# Patient Record
Sex: MALE | Race: BLACK OR AFRICAN AMERICAN
[De-identification: names, ages, dates, MRNs, and addresses within clinical notes are randomized per-mention and may not be internally consistent; named-entity substitution may affect disease eponyms.]

---

## 2018-02-05 ENCOUNTER — HOSPITAL ENCOUNTER (OUTPATIENT)
Dept: HOSPITAL 92 - LABBT | Age: 68
Discharge: HOME | End: 2018-02-05
Attending: INTERNAL MEDICINE
Payer: OTHER GOVERNMENT

## 2018-02-05 DIAGNOSIS — I25.10: ICD-10-CM

## 2018-02-05 DIAGNOSIS — Z01.812: Primary | ICD-10-CM

## 2018-02-05 LAB
ALBUMIN SERPL BCG-MCNC: 3.9 G/DL (ref 3.4–4.8)
ALP SERPL-CCNC: 46 U/L (ref 40–150)
ALT SERPL W P-5'-P-CCNC: 21 U/L (ref 8–55)
ANION GAP SERPL CALC-SCNC: 11 MMOL/L (ref 10–20)
APTT PPP: 28.8 SEC (ref 22.9–36.1)
AST SERPL-CCNC: 15 U/L (ref 5–34)
BILIRUB SERPL-MCNC: 0.6 MG/DL (ref 0.2–1.2)
BUN SERPL-MCNC: 16 MG/DL (ref 8.4–25.7)
CALCIUM SERPL-MCNC: 9.5 MG/DL (ref 7.8–10.44)
CHD RISK SERPL-RTO: 2.3 (ref ?–4.5)
CHLORIDE SERPL-SCNC: 106 MMOL/L (ref 98–107)
CHOLEST SERPL-MCNC: 169 MG/DL
CO2 SERPL-SCNC: 20 MMOL/L (ref 23–31)
CREAT CL PREDICTED SERPL C-G-VRATE: 0 ML/MIN (ref 70–130)
GLOBULIN SER CALC-MCNC: 2.6 G/DL (ref 2.4–3.5)
GLUCOSE SERPL-MCNC: 70 MG/DL (ref 80–115)
HDLC SERPL-MCNC: 74 MG/DL
HGB BLD-MCNC: 14.9 G/DL (ref 14–18)
INR PPP: 1
LDLC SERPL CALC-MCNC: 78 MG/DL
MCH RBC QN AUTO: 41.2 PG (ref 27–31)
MCV RBC AUTO: 123 FL (ref 80–94)
PLATELET # BLD AUTO: 274 THOU/UL (ref 130–400)
POTASSIUM SERPL-SCNC: 4.4 MMOL/L (ref 3.5–5.1)
PROTHROMBIN TIME: 12.8 SEC (ref 12–14.7)
RBC # BLD AUTO: 3.62 MILL/UL (ref 4.7–6.1)
SODIUM SERPL-SCNC: 133 MMOL/L (ref 136–145)
TRIGL SERPL-MCNC: 87 MG/DL (ref ?–150)
WBC # BLD AUTO: 7.4 THOU/UL (ref 4.8–10.8)

## 2018-02-05 PROCEDURE — 80053 COMPREHEN METABOLIC PANEL: CPT

## 2018-02-05 PROCEDURE — 85610 PROTHROMBIN TIME: CPT

## 2018-02-05 PROCEDURE — 80061 LIPID PANEL: CPT

## 2018-02-05 PROCEDURE — 85730 THROMBOPLASTIN TIME PARTIAL: CPT

## 2018-02-05 PROCEDURE — 85027 COMPLETE CBC AUTOMATED: CPT

## 2018-02-06 ENCOUNTER — HOSPITAL ENCOUNTER (OUTPATIENT)
Dept: HOSPITAL 92 - CCL | Age: 68
Discharge: HOME | End: 2018-02-06
Attending: INTERNAL MEDICINE
Payer: OTHER GOVERNMENT

## 2018-02-06 DIAGNOSIS — K21.9: ICD-10-CM

## 2018-02-06 DIAGNOSIS — Z95.5: ICD-10-CM

## 2018-02-06 DIAGNOSIS — F17.200: ICD-10-CM

## 2018-02-06 DIAGNOSIS — Z79.02: ICD-10-CM

## 2018-02-06 DIAGNOSIS — Z79.82: ICD-10-CM

## 2018-02-06 DIAGNOSIS — I25.10: Primary | ICD-10-CM

## 2018-02-06 DIAGNOSIS — E78.00: ICD-10-CM

## 2018-02-06 DIAGNOSIS — I73.9: ICD-10-CM

## 2018-02-06 DIAGNOSIS — Z90.49: ICD-10-CM

## 2018-02-06 DIAGNOSIS — Z88.1: ICD-10-CM

## 2018-02-06 DIAGNOSIS — Z88.2: ICD-10-CM

## 2018-02-06 DIAGNOSIS — Z79.899: ICD-10-CM

## 2018-02-06 DIAGNOSIS — R07.9: ICD-10-CM

## 2018-02-06 DIAGNOSIS — Z89.611: ICD-10-CM

## 2018-02-06 DIAGNOSIS — Z98.890: ICD-10-CM

## 2018-02-06 DIAGNOSIS — Z89.612: ICD-10-CM

## 2018-02-06 DIAGNOSIS — I10: ICD-10-CM

## 2018-02-06 PROCEDURE — C9600 PERC DRUG-EL COR STENT SING: HCPCS

## 2018-02-06 PROCEDURE — 93798 PHYS/QHP OP CAR RHAB W/ECG: CPT

## 2018-02-06 PROCEDURE — C1887 CATHETER, GUIDING: HCPCS

## 2018-02-06 PROCEDURE — C1874 STENT, COATED/COV W/DEL SYS: HCPCS

## 2018-02-06 PROCEDURE — 85347 COAGULATION TIME ACTIVATED: CPT

## 2018-02-06 PROCEDURE — C1769 GUIDE WIRE: HCPCS

## 2018-02-06 PROCEDURE — 93005 ELECTROCARDIOGRAM TRACING: CPT

## 2018-02-06 PROCEDURE — 92928 PRQ TCAT PLMT NTRAC ST 1 LES: CPT

## 2018-02-06 PROCEDURE — 93458 L HRT ARTERY/VENTRICLE ANGIO: CPT

## 2018-02-06 NOTE — DIS
DATE OF ADMISSION:  02/06/2018

 

DATE OF DISCHARGE:  02/06/2018

 

DATE OF OUTPATIENT PROCEDURE:  02/06/2018

 

INDICATION FOR PROCEDURE:  A 67-year-old patient with known coronary disease and chest pain.

 

ADMITTING DIAGNOSES:  Coronary artery disease, abnormal stress testing, history of stent placement in
 the past, history of continued tobacco abuse, history of hypercholesterolemia, history of hypertensi
on, gastroesophageal reflux disease, significant peripheral vascular disease.  He is status post left
 knee surgery.  He has bilateral leg amputations.  He has had right testicular surgery.  He has had a
 right rib surgery, hernia repair.  He has had a stent placed in the lower extremity and also has had
 a cholecystectomy.

 

DISCHARGE DIAGNOSES:  Coronary artery disease, abnormal stress testing, history of stent placement in
 the past, history of continued tobacco abuse, history of hypercholesterolemia, history of hypertensi
on, gastroesophageal reflux disease, significant peripheral vascular disease.  He is status post left
 knee surgery.  He has bilateral leg amputations.  He has had right testicular surgery.  He has had a
 right rib surgery, hernia repair.  He has had a stent placed in the lower extremity and also has had
 a cholecystectomy.

 

PROCEDURES IN THE HOSPITAL:  Included cardiac catheterization, left ventriculogram, coronary arteriog
irene, angioplasty and stent placement to the second obtuse marginal branch of the left circumflex.

 

DISCHARGE MEDICATIONS:  Include lisinopril 40 mg daily, magnesium 400 mg daily, methocarbamol 750 mg 
tablet daily, metoprolol 12.5 mg b.i.d., nitroglycerin sublingual tablets 0.4 mg as needed, simvastat
in 20 mg daily, Flomax 0.4 mg daily, trazodone 1 tablet 100 mg daily, albuterol sulfate 2 puffs every
 6 hours p.r.n. as needed, amlodipine 10 mg daily, aspirin 325 mg daily, Symbicort 160/4.5 two puffs 
b.i.d., vitamin D3 2000 units orally every day, clonidine 0.1 mg tablets b.i.d., Plavix 75 mg daily, 
Folvite 1 mg daily, hydrocodone/acetaminophen 5/325 one tablet p.r.n. as directed, Hydrea 1 tablet 50
0 mg daily, Imdur 30 mg tablets daily.

 

He will follow up with me in the office in the next 2-4 weeks.  He will continue his routine followup
s with his primary care physician, Dr. Cathie Villanueva.

 

HOSPITAL COURSE:  This is a very pleasant 67-year-old gentleman who unfortunately continues to smoke,
 has severe peripheral vascular disease and coronary disease, has undergone angioplasty and stent lien
cement in left anterior descending artery, presents to the office complaining of further chest discom
fort.  He was advised to undergo repeat cardiac catheterization.  He was taken to the cardiac cathete
rization lab where he was found to have a patent stent in the left anterior descending artery and pro
gression of disease in second obtuse marginal branch of left circumflex which was 95% occluded and al
so he has a 90% stenosis in the first diagonal branch that arises from the left anterior descending a
rtery just at the takeoff at the stented left anterior descending artery.  The right coronary had lum
inal irregularities, but no flow limiting disease was noted.  The left ventricular systolic function 
is still normal, ejection fraction of 60%.  He underwent angioplasty today with a 2.5 mm balloon to t
he obtuse marginal branch of the left circumflex with still some residual stenosis.  Then he underwen
t further stent placement with a 2.25 x 12 mm drug-coated stent to the obtuse marginal branch of left
 circumflex with 0% residual stenosis.  He was given some nitroglycerin intracoronary during the proc
edure.  He tolerated the procedure well.  There were no difficulties or complications during the proc
edure with the balloon inflated.  He described this as the same pain that he has been experiencing at
 home.  I believe this was successful.  This most likely is the culprit vessel that was involved with
 his angina.  I will see him back in the office in the next 2-4 weeks.  At this time, he has remained
 stable and will be discharged to home within the next 6 hours and if there is no bleeding complicati
ons from the actual procedure.

## 2018-02-06 NOTE — EKG
Test Reason : POST PTCA/STENT

Blood Pressure : ***/*** mmHG

Vent. Rate : 059 BPM     Atrial Rate : 059 BPM

   P-R Int : 140 ms          QRS Dur : 068 ms

    QT Int : 426 ms       P-R-T Axes : 058 008 068 degrees

   QTc Int : 421 ms

 

Sinus bradycardia with sinus arrhythmia

Otherwise normal ECG

When compared with ECG of 11-MAY-2012 13:08,

No significant change was found

Confirmed by MARIA DEL CARMEN SY ([id number]) on 2/6/2018 9:09:03 PM

 

Referred By:  ELLIOTT           Confirmed By:MARIA DEL CARMEN SY

## 2019-03-12 ENCOUNTER — HOSPITAL ENCOUNTER (OUTPATIENT)
Dept: HOSPITAL 92 - CT | Age: 69
Discharge: HOME | End: 2019-03-12
Attending: THORACIC SURGERY (CARDIOTHORACIC VASCULAR SURGERY)
Payer: COMMERCIAL

## 2019-03-12 DIAGNOSIS — Z53.9: ICD-10-CM

## 2019-03-12 DIAGNOSIS — I25.10: Primary | ICD-10-CM

## 2019-03-12 LAB — ESTIMATED GFR-MDRD - POC: (no result)

## 2019-03-12 PROCEDURE — 82565 ASSAY OF CREATININE: CPT

## 2019-03-14 ENCOUNTER — HOSPITAL ENCOUNTER (OUTPATIENT)
Dept: HOSPITAL 92 - CT | Age: 69
Discharge: HOME | End: 2019-03-14
Attending: THORACIC SURGERY (CARDIOTHORACIC VASCULAR SURGERY)
Payer: COMMERCIAL

## 2019-03-14 DIAGNOSIS — I25.10: Primary | ICD-10-CM

## 2019-03-14 DIAGNOSIS — I66.8: ICD-10-CM

## 2019-03-14 DIAGNOSIS — I70.201: ICD-10-CM

## 2019-03-14 PROCEDURE — 75635 CT ANGIO ABDOMINAL ARTERIES: CPT

## 2019-03-14 NOTE — CT
CT ANGIO ABDOMEN AND PELVIS AND BILATERAL LOWER EXTREMITIES WITH INTRAVENOUS CONTRAST WITH 3D RECONST
RUCTIONS:

 

HISTORY:

Bilateral leg pain, worse on the right side.

 

COMPARISON:

06/29/2017

 

FINDINGS:

The lung bases are clear.  The liver, spleen, pancreas, and gallbladder regions appear unremarkable o
n this angiographic phase exam.

 

The right and left adrenal glands and the right and left kidneys are normal in size.  The left renal 
cyst is stable, as compared to the previous exam.  There is no significant periaortic or mesenteric a
denopathy.  There is sigmoid diverticulosis noted.

 

There is some mild atherosclerotic change of the abdominal aorta, which is normal in caliber.  No dis
section or aneurysm.  There is some mild plaque formation at the origin of the celiac artery.  There 
is more pronounced plaque formation at the origin of the superior mesenteric artery, with a mild to m
oderate degree of narrowing, difficult to assess due to the calcified plaque.  A single left renal ar
brian is seen without significant stenosis.  There are two right renal arteries without any significan
t narrowing.  JAYASHREE is patent.

 

The right lower extremity runoff shows some moderate plaque and mild to moderate narrowing along the 
course of the common iliac artery, with more severe stenosis near the origin of the right external il
iac artery.  There is also fairly extensive plaque formation in the internal iliac.  There is moderat
e narrowing of the right common femoral artery, just above the level of the anastomosis, related to t
he stent that has been placed.  Once again, it is noted that this stent is occluded.  There is flow w
ithin the profunda femoral artery.  The native superficial femoral artery is occluded.  The patient h
as undergone an above-the-knee amputation on this side.

 

On the left side, there is mild to moderate narrowing along the course of the common iliac artery, an
d moderately severe stenosis of the origin of the external and internal iliac arteries, and in the mo
re distal external iliac artery, another area of more severe stenosis.  There is dilatation at the le
ft common femoral vein level that is similar to the prior examination.  There are two occluded stents
 seen, which extend down to the level of the below the knee amputation.  There is occlusion of the na
tive superficial femoral artery.  The profunda femoral artery patent, although it appears to be fairl
y markedly narrowed at its origin.

 

IMPRESSION:

1.  No evidence of aortic aneurysm dissection.

 

2.  Mild to moderate stenosis of the common iliac artery with moderately severe narrowing of the righ
t external iliac artery at its origin.  Also, moderate stenosis of the common femoral artery, just ab
ove the anastomosis of an occluded left-sided stent.  There is flow within the profunda femoral arter
y, but the native left femoral artery is occluded.

 

3.  On the left side, similar changes.  There is mild to moderate narrowing of the common iliac arter
y, moderately severe stenosis of the origin of the external iliac artery, and a second focal area of 
marked narrowing of the left external iliac artery, more distally.  There are two stents that are occ
luded on the left side, and the native left superficial femoral artery is occluded.  There is flow wi
thin the profunda femoral artery and its branches.

 

POS: [facility]

## 2019-04-01 ENCOUNTER — HOSPITAL ENCOUNTER (OUTPATIENT)
Dept: HOSPITAL 92 - LABBT | Age: 69
Discharge: HOME | End: 2019-04-01
Attending: THORACIC SURGERY (CARDIOTHORACIC VASCULAR SURGERY)
Payer: MEDICARE

## 2019-04-01 DIAGNOSIS — I65.8: ICD-10-CM

## 2019-04-01 DIAGNOSIS — Z01.812: Primary | ICD-10-CM

## 2019-04-01 LAB
ANION GAP SERPL CALC-SCNC: 14 MMOL/L (ref 10–20)
BUN SERPL-MCNC: 11 MG/DL (ref 8.4–25.7)
CALCIUM SERPL-MCNC: 9 MG/DL (ref 7.8–10.44)
CHLORIDE SERPL-SCNC: 101 MMOL/L (ref 98–107)
CO2 SERPL-SCNC: 18 MMOL/L (ref 23–31)
CREAT CL PREDICTED SERPL C-G-VRATE: 0 ML/MIN (ref 70–130)
GLUCOSE SERPL-MCNC: 71 MG/DL (ref 80–115)
HGB BLD-MCNC: 13.6 G/DL (ref 14–18)
MCH RBC QN AUTO: 41.2 PG (ref 27–31)
MCV RBC AUTO: 119 FL (ref 78–98)
PLATELET # BLD AUTO: 210 THOU/UL (ref 130–400)
POTASSIUM SERPL-SCNC: 4.7 MMOL/L (ref 3.5–5.1)
RBC # BLD AUTO: 3.31 MILL/UL (ref 4.7–6.1)
SODIUM SERPL-SCNC: 128 MMOL/L (ref 136–145)
WBC # BLD AUTO: 5.2 THOU/UL (ref 4.8–10.8)

## 2019-04-01 PROCEDURE — 85027 COMPLETE CBC AUTOMATED: CPT

## 2019-04-01 PROCEDURE — 80048 BASIC METABOLIC PNL TOTAL CA: CPT

## 2019-04-10 ENCOUNTER — HOSPITAL ENCOUNTER (OUTPATIENT)
Dept: HOSPITAL 92 - CCL | Age: 69
Discharge: HOME | End: 2019-04-10
Attending: THORACIC SURGERY (CARDIOTHORACIC VASCULAR SURGERY)
Payer: MEDICARE

## 2019-04-10 VITALS — BODY MASS INDEX: 19.2 KG/M2

## 2019-04-10 DIAGNOSIS — E78.5: ICD-10-CM

## 2019-04-10 DIAGNOSIS — Z79.899: ICD-10-CM

## 2019-04-10 DIAGNOSIS — I25.10: ICD-10-CM

## 2019-04-10 DIAGNOSIS — Z79.82: ICD-10-CM

## 2019-04-10 DIAGNOSIS — I70.221: Primary | ICD-10-CM

## 2019-04-10 DIAGNOSIS — Z89.611: ICD-10-CM

## 2019-04-10 DIAGNOSIS — B19.20: ICD-10-CM

## 2019-04-10 DIAGNOSIS — Z88.2: ICD-10-CM

## 2019-04-10 DIAGNOSIS — I10: ICD-10-CM

## 2019-04-10 PROCEDURE — 99152 MOD SED SAME PHYS/QHP 5/>YRS: CPT

## 2019-04-10 PROCEDURE — 99153 MOD SED SAME PHYS/QHP EA: CPT

## 2019-04-10 PROCEDURE — C1769 GUIDE WIRE: HCPCS

## 2019-04-10 PROCEDURE — 85347 COAGULATION TIME ACTIVATED: CPT

## 2019-04-10 PROCEDURE — C1725 CATH, TRANSLUMIN NON-LASER: HCPCS

## 2019-04-10 PROCEDURE — 37221: CPT

## 2019-04-10 PROCEDURE — 76942 ECHO GUIDE FOR BIOPSY: CPT

## 2019-04-10 NOTE — OP
DATE OF PROCEDURE:  04/10/2019



PREOPERATIVE DIAGNOSIS:  Ischemic rest pain, right AKA stump.



PROCEDURE PERFORMED:  Iliac angiography with right external iliac artery stent using

8 x 40 Innova posted with a 7 balloon. 



ANESTHESIA:  General.



ESTIMATED BLOOD LOSS:  Minimal.



FLUORO:  3.2 minutes.



DESCRIPTION OF PROCEDURE:  After prepping and draping the right groin,

ultrasound-guided puncture was carried out.  Wire passed and initially a 4-Peruvian

dilator and sheath were placed and angiography obtained.  The patient had about a

70% diameter stenosis of the proximal external iliac artery on the right with minor

irregularities otherwise and a 70% stenosis of the profunda femoral artery.  The

patient was heparinized.  A 4-Peruvian, 5-Peruvian, and then 6-Peruvian marker sheaths

were placed.  The Innova stent was then deployed just below the hypogastric takeoff

on the right and posted with a 7 balloon.  Following this, completion angiography

showed a good result and the patient tolerated the procedure well.  Heparin was

reversed and sheath removed. 







Job ID:  [id number]

## 2019-06-03 ENCOUNTER — HOSPITAL ENCOUNTER (OUTPATIENT)
Dept: HOSPITAL 92 - CT | Age: 69
Discharge: HOME | End: 2019-06-03
Attending: CLINIC/CENTER
Payer: OTHER GOVERNMENT

## 2019-06-03 DIAGNOSIS — Z72.0: Primary | ICD-10-CM

## 2019-06-03 PROCEDURE — G0297 LDCT FOR LUNG CA SCREEN: HCPCS

## 2019-06-03 NOTE — CT
EXAM: CT chest without contrast per low-dose cancer screening protocol



HISTORY: History of smoking and nicotine dependence; greater than 50 pack-year smoking history



COMPARISON: None



TECHNIQUE: Multiple contiguous axial images were obtained in a CT of the chest without contrast per l
ow-dose cancer screening protocol. Sagittal and coronal reformats were performed.



FINDINGS: 

Pulmonary nodules: Calcified granulomas are seen in the right middle lobe. No suspicious pulmonary no
dules are seen. No focal infiltrates are seen.

Pleural space: No pneumothorax or pleural effusion are seen. 



Heart: The heart is normal in size. Atherosclerotic calcifications in the aorta and coronary arteries
.

Mediastinum: No hilar or mediastinal lymphadenopathy appreciated on this limited noncontrast examinat
ion.



Bones: Unremarkable.



Visualized subdiaphragmatic structures: Unremarkable.



IMPRESSION:

Lung RADS category 1-negative.



Reported By: Isidro Conteh 

Electronically Signed:  6/3/2019 1:11 PM

## 2020-06-04 ENCOUNTER — HOSPITAL ENCOUNTER (EMERGENCY)
Dept: HOSPITAL 92 - ERS | Age: 70
Discharge: HOME | End: 2020-06-04
Payer: MEDICARE

## 2020-06-04 DIAGNOSIS — I25.10: ICD-10-CM

## 2020-06-04 DIAGNOSIS — E78.5: ICD-10-CM

## 2020-06-04 DIAGNOSIS — K21.9: ICD-10-CM

## 2020-06-04 DIAGNOSIS — F17.200: ICD-10-CM

## 2020-06-04 DIAGNOSIS — T87.89: Primary | ICD-10-CM

## 2020-06-04 DIAGNOSIS — I10: ICD-10-CM

## 2020-06-04 PROCEDURE — 96372 THER/PROPH/DIAG INJ SC/IM: CPT

## 2020-10-09 NOTE — CT
CT PULMONARY LUNG SCAN WITHOUT IV CONTRAST:

 

INDICATION: 

A 70-year-old male with a history of being  current smoker and smoking approximately 1 pack per day o
ngoing for 55 years.  The patient has a history of leukemia and right rib resection.  The patient is 
also a double amputee.  The patient has a history of COPD.

 

COMPARISON: 

Prior CT pulmonary lung scan dated Megan 3, 2019 and a CTA bilateral lower extremity runoff dated Marc
h 14, 2019.

 

FINDINGS: 

 

Lungs:  There is no suspicious pulmonary nodule that is suspicious for early malignancy.  There are c
hanges of scattered centrilobular emphysema which is moderate in severity.  There are areas of mild p
eripheral fibrosis seen with scattered areas of mild bronchiectasis.  There are calcified granuloma w
ithin the right middle lobe.  No suspicious confluent airspace opacity is grossly evident.

 

Pleural Space:  No pleural effusion or pneumothorax is demonstrated.

 

Mediastinum:  There are coronary artery and thoracic aorta calcifications which are severe.  There ar
e aortic annular calcifications.  No pathologically enlarged lymph nodes are evident.  There is mucus
 debris seen along the left posterolateral aspect of the distal mainstem trachea that is inspissated 
and has locules of gas within it.

 

Upper Abdomen:  There is a stable prominent cyst partially visualized involving the mid left kidney. 
 The spleen is grossly unremarkable on this noncontrast exam.  The near total visualization of the li
aishwarya appears within normal limits.  Visualized aspects of the unopacified large and small bowel reveal
 no acute abnormality.  There is scattered colonic diverticulosis.  There is a tiny cyst involving th
e superior pole of the right kidney.  The visualized gallbladder is unremarkable.

 

Osseous Structures:  There is diffuse osteopenia present.  There is thoracic spondylosis.  The right 
1st rib is partially resected.  There are surgical clips within the right axilla.  No suspicious oste
olytic or osteoblastic lesion is identified.

 

IMPRESSION: 

Lung RADS category 1 - benign.  Recommend low-dose annual lung cancer screening in 1 year.

 

Category S:

Moderate emphysema.  Findings of prior granulomatous disease.  Prominent vascular calcifications of t
he thoracic aorta and coronary arteries.  Colonic diverticulosis.  Bilateral renal cysts.  Diffuse os
teopenia.  Postprocedural change of a right partial 1st rib resection.

 

POS: [facility]

## 2020-10-09 NOTE — ULT
ABDOMINAL AORTIC ULTRASOUND:

 

HISTORY: 

Abdominal aortic aneurysm screening.

 

FINDINGS: 

Real-time imaging of the abdominal aorta shows atherosclerotic change.  The proximal aorta measures 2
.2, the mid aorta 1.8, and distal aorta 1.5 cm.  The proximal common iliac arteries also appear nondi
lated.

 

IMPRESSION: 

No evidence of abdominal aortic aneurysm.

 

POS: [facility]

## 2021-12-21 ENCOUNTER — HOSPITAL ENCOUNTER (OUTPATIENT)
Dept: HOSPITAL 92 - CSHULT | Age: 71
Discharge: HOME | End: 2021-12-21
Attending: INTERNAL MEDICINE
Payer: MEDICARE

## 2021-12-21 DIAGNOSIS — N28.1: ICD-10-CM

## 2021-12-21 DIAGNOSIS — N18.1: ICD-10-CM

## 2021-12-21 DIAGNOSIS — J44.9: ICD-10-CM

## 2021-12-21 DIAGNOSIS — D47.3: ICD-10-CM

## 2021-12-21 DIAGNOSIS — R80.9: ICD-10-CM

## 2021-12-21 DIAGNOSIS — N40.0: ICD-10-CM

## 2021-12-21 DIAGNOSIS — N39.0: ICD-10-CM

## 2021-12-21 DIAGNOSIS — I25.10: ICD-10-CM

## 2021-12-21 DIAGNOSIS — M19.90: ICD-10-CM

## 2021-12-21 DIAGNOSIS — I73.9: ICD-10-CM

## 2021-12-21 DIAGNOSIS — I13.10: Primary | ICD-10-CM

## 2021-12-21 DIAGNOSIS — E87.1: ICD-10-CM

## 2021-12-21 DIAGNOSIS — E78.5: ICD-10-CM

## 2021-12-21 PROCEDURE — 76770 US EXAM ABDO BACK WALL COMP: CPT

## 2022-01-27 ENCOUNTER — HOSPITAL ENCOUNTER (OUTPATIENT)
Dept: HOSPITAL 92 - CSHRAD | Age: 72
Discharge: HOME | End: 2022-01-27
Attending: INTERNAL MEDICINE
Payer: MEDICARE

## 2022-01-27 ENCOUNTER — HOSPITAL ENCOUNTER (OUTPATIENT)
Dept: HOSPITAL 92 - CSHERS | Age: 72
Setting detail: OBSERVATION
LOS: 1 days | Discharge: HOME | End: 2022-01-28
Attending: INTERNAL MEDICINE | Admitting: FAMILY MEDICINE
Payer: OTHER GOVERNMENT

## 2022-01-27 DIAGNOSIS — Z20.822: ICD-10-CM

## 2022-01-27 DIAGNOSIS — Z95.5: ICD-10-CM

## 2022-01-27 DIAGNOSIS — I10: ICD-10-CM

## 2022-01-27 DIAGNOSIS — Z86.718: ICD-10-CM

## 2022-01-27 DIAGNOSIS — E78.5: ICD-10-CM

## 2022-01-27 DIAGNOSIS — G89.4: ICD-10-CM

## 2022-01-27 DIAGNOSIS — Z79.899: ICD-10-CM

## 2022-01-27 DIAGNOSIS — I82.611: ICD-10-CM

## 2022-01-27 DIAGNOSIS — Z79.82: ICD-10-CM

## 2022-01-27 DIAGNOSIS — D75.839: ICD-10-CM

## 2022-01-27 DIAGNOSIS — Z89.512: ICD-10-CM

## 2022-01-27 DIAGNOSIS — J44.9: ICD-10-CM

## 2022-01-27 DIAGNOSIS — Z89.611: ICD-10-CM

## 2022-01-27 DIAGNOSIS — Z86.73: ICD-10-CM

## 2022-01-27 DIAGNOSIS — F17.210: ICD-10-CM

## 2022-01-27 DIAGNOSIS — N40.0: ICD-10-CM

## 2022-01-27 DIAGNOSIS — I82.621: Primary | ICD-10-CM

## 2022-01-27 DIAGNOSIS — M79.89: ICD-10-CM

## 2022-01-27 DIAGNOSIS — M79.89: Primary | ICD-10-CM

## 2022-01-27 DIAGNOSIS — I25.10: ICD-10-CM

## 2022-01-27 DIAGNOSIS — I73.9: ICD-10-CM

## 2022-01-27 LAB
ALBUMIN SERPL BCG-MCNC: 3.4 G/DL (ref 3.4–4.8)
ALP SERPL-CCNC: 89 U/L (ref 40–110)
ALT SERPL W P-5'-P-CCNC: 12 U/L (ref 8–55)
ANION GAP SERPL CALC-SCNC: 11 MMOL/L (ref 10–20)
AST SERPL-CCNC: 19 U/L (ref 5–34)
BASOPHILS # BLD AUTO: 0 10X3/UL (ref 0–0.2)
BASOPHILS NFR BLD AUTO: 0.7 % (ref 0–2)
BILIRUB SERPL-MCNC: 0.6 MG/DL (ref 0.2–1.2)
BUN SERPL-MCNC: 8 MG/DL (ref 8.4–25.7)
CALCIUM SERPL-MCNC: 9 MG/DL (ref 7.8–10.44)
CHLORIDE SERPL-SCNC: 97 MMOL/L (ref 98–107)
CO2 SERPL-SCNC: 26 MMOL/L (ref 23–31)
CREAT CL PREDICTED SERPL C-G-VRATE: 0 ML/MIN (ref 70–130)
EOSINOPHIL # BLD AUTO: 0.1 10X3/UL (ref 0–0.5)
EOSINOPHIL NFR BLD AUTO: 1.2 % (ref 0–6)
GLOBULIN SER CALC-MCNC: 3.2 G/DL (ref 2.4–3.5)
GLUCOSE SERPL-MCNC: 74 MG/DL (ref 83–110)
HGB BLD-MCNC: 13.8 G/DL (ref 13.5–17.5)
LYMPHOCYTES NFR BLD AUTO: 26.6 % (ref 18–47)
MCH RBC QN AUTO: 37 PG (ref 27–33)
MCV RBC AUTO: 108.6 FL (ref 81.2–95.1)
MONOCYTES # BLD AUTO: 0.3 10X3/UL (ref 0–1.1)
MONOCYTES NFR BLD AUTO: 7 % (ref 0–10)
NEUTROPHILS # BLD AUTO: 2.8 10X3/UL (ref 1.5–8.4)
NEUTROPHILS NFR BLD AUTO: 64.3 % (ref 40–75)
PLATELET # BLD AUTO: 183 10X3/UL (ref 150–450)
POTASSIUM SERPL-SCNC: 4.6 MMOL/L (ref 3.5–5.1)
RBC # BLD AUTO: 3.73 10X6/UL (ref 4.32–5.72)
SODIUM SERPL-SCNC: 129 MMOL/L (ref 136–145)
WBC # BLD AUTO: 4.3 10X3/UL (ref 3.5–10.5)

## 2022-01-27 PROCEDURE — 83735 ASSAY OF MAGNESIUM: CPT

## 2022-01-27 PROCEDURE — G0378 HOSPITAL OBSERVATION PER HR: HCPCS

## 2022-01-27 PROCEDURE — 85610 PROTHROMBIN TIME: CPT

## 2022-01-27 PROCEDURE — 96374 THER/PROPH/DIAG INJ IV PUSH: CPT

## 2022-01-27 PROCEDURE — 96372 THER/PROPH/DIAG INJ SC/IM: CPT

## 2022-01-27 PROCEDURE — 80048 BASIC METABOLIC PNL TOTAL CA: CPT

## 2022-01-27 PROCEDURE — 94760 N-INVAS EAR/PLS OXIMETRY 1: CPT

## 2022-01-27 PROCEDURE — 94640 AIRWAY INHALATION TREATMENT: CPT

## 2022-01-27 PROCEDURE — 85730 THROMBOPLASTIN TIME PARTIAL: CPT

## 2022-01-27 PROCEDURE — 85025 COMPLETE CBC W/AUTO DIFF WBC: CPT

## 2022-01-27 PROCEDURE — 80053 COMPREHEN METABOLIC PANEL: CPT

## 2022-01-27 PROCEDURE — U0002 COVID-19 LAB TEST NON-CDC: HCPCS

## 2022-01-28 VITALS — SYSTOLIC BLOOD PRESSURE: 163 MMHG | DIASTOLIC BLOOD PRESSURE: 59 MMHG

## 2022-01-28 LAB
ANION GAP SERPL CALC-SCNC: 13 MMOL/L (ref 10–20)
APTT PPP: 36.5 SEC (ref 22–33)
BUN SERPL-MCNC: 9 MG/DL (ref 8.4–25.7)
CALCIUM SERPL-MCNC: 9 MG/DL (ref 7.8–10.44)
CHLORIDE SERPL-SCNC: 98 MMOL/L (ref 98–107)
CO2 SERPL-SCNC: 24 MMOL/L (ref 23–31)
CREAT CL PREDICTED SERPL C-G-VRATE: 0 ML/MIN (ref 70–130)
GLUCOSE SERPL-MCNC: 80 MG/DL (ref 83–110)
HGB BLD-MCNC: 14.1 G/DL (ref 13.5–17.5)
INR PPP: 1
MAGNESIUM SERPL-MCNC: 2 MG/DL (ref 1.6–2.6)
MCH RBC QN AUTO: 36.9 PG (ref 27–33)
MCV RBC AUTO: 106.3 FL (ref 81.2–95.1)
MDIFF COMPLETE?: YES
PLATELET # BLD AUTO: 184 10X3/UL (ref 150–450)
POTASSIUM SERPL-SCNC: 4.6 MMOL/L (ref 3.5–5.1)
PROTHROMBIN TIME: 11.1 SEC (ref 9.5–12.1)
RBC # BLD AUTO: 3.82 10X6/UL (ref 4.32–5.72)
SODIUM SERPL-SCNC: 130 MMOL/L (ref 136–145)
WBC # BLD AUTO: 3.9 10X3/UL (ref 3.5–10.5)